# Patient Record
(demographics unavailable — no encounter records)

---

## 2025-07-01 NOTE — HISTORY OF PRESENT ILLNESS
[FreeTextEntry1] : 34 year male presents to clinic with c/c of ingrown toe nail Left hallux lateral border  Ingrown

## 2025-07-01 NOTE — PROCEDURE
[Partial Nail Avulsion] : partial nail avulsion on [1] : toe 1 [Lateral Border] : lateral nail border [Left Foot] : on the left foot [Therapeutic] : therapeutic [Patient] : the patient [Risks] : risks [Ethyl Chloride] : ethyl chloride [1%] : 1%  [Without Epi] : without epinephrine [Alcohol] : alcohol [27 gauge] : A 27 gauge needle was used [Betamethasone] : Betamethasone [Tolerated Well] : tolerated the procedure well

## 2025-07-01 NOTE — ASSESSMENT
[FreeTextEntry1] :  -discussed proper nail hygiene  -avoid narrow shoes -discussed phenol matrixectomy of chronic recurrence  Follow-up as needed

## 2025-07-01 NOTE — PHYSICAL EXAM
[General Appearance - Alert] : alert [General Appearance - In No Acute Distress] : in no acute distress [2+] : left foot dorsalis pedis 2+ [FreeTextEntry1] : Noted ingrown nail on the lateral border of the left hallux, noted hypertrophy of the nail border, tender on palpation, no erythema no drainage